# Patient Record
Sex: MALE | URBAN - METROPOLITAN AREA
[De-identification: names, ages, dates, MRNs, and addresses within clinical notes are randomized per-mention and may not be internally consistent; named-entity substitution may affect disease eponyms.]

---

## 2021-04-06 ENCOUNTER — TELEPHONE ENCOUNTER (OUTPATIENT)
Dept: URBAN - METROPOLITAN AREA CLINIC 35 | Facility: CLINIC | Age: 51
End: 2021-04-06

## 2021-04-06 RX ORDER — ALBENDAZOLE 200 MG/1
2 TABLETS TABLET, FILM COATED ORAL ONCE A DAY
Qty: 10 TABLET | Refills: 0 | OUTPATIENT
Start: 2021-04-06

## 2021-07-12 ENCOUNTER — OFFICE VISIT (OUTPATIENT)
Dept: URBAN - METROPOLITAN AREA CLINIC 35 | Facility: CLINIC | Age: 51
End: 2021-07-12

## 2021-07-12 ENCOUNTER — TELEPHONE ENCOUNTER (OUTPATIENT)
Dept: URBAN - METROPOLITAN AREA CLINIC 35 | Facility: CLINIC | Age: 51
End: 2021-07-12

## 2021-07-12 VITALS
HEIGHT: 73 IN | HEART RATE: 94 BPM | SYSTOLIC BLOOD PRESSURE: 135 MMHG | WEIGHT: 180 LBS | BODY MASS INDEX: 23.86 KG/M2 | OXYGEN SATURATION: 97 % | DIASTOLIC BLOOD PRESSURE: 70 MMHG

## 2021-07-12 RX ORDER — SEMAGLUTIDE 1.34 MG/ML
AS DIRECTED INJECTION, SOLUTION SUBCUTANEOUS
Status: ACTIVE | COMMUNITY

## 2021-07-12 RX ORDER — ACETAMINOPHEN,PHENIRAMINE MALEATE, PHENYLEPHRINE HYDROCHLORIDE 650; 20; 10 MG/15000MG; MG/15000MG; MG/15000MG
AS DIRECTED POWDER, FOR SUSPENSION ORAL
OUTPATIENT
Start: 2021-07-12

## 2021-07-12 RX ORDER — EMPAGLIFLOZIN 25 MG/1
1 TABLET TABLET, FILM COATED ORAL ONCE A DAY
Qty: 30 | Status: ACTIVE | COMMUNITY

## 2021-07-12 RX ORDER — METFORMIN HYDROCHLORIDE 1000 MG/1
1 TABLET WITH A MEAL TABLET, FILM COATED ORAL ONCE A DAY
Qty: 30 | Status: ACTIVE | COMMUNITY

## 2021-07-12 RX ORDER — KRILL/OM-3/DHA/EPA/PHOSPHO/AST 1000-230MG
1 TABLET CAPSULE ORAL ONCE A DAY
Qty: 30 | Status: ON HOLD | COMMUNITY

## 2021-07-12 RX ORDER — ASPIRIN 81 MG/1
1 TABLET TABLET, COATED ORAL ONCE A DAY
Qty: 30 | Status: ACTIVE | COMMUNITY

## 2021-07-12 RX ORDER — SODIUM SULFATE, POTASSIUM SULFATE, MAGNESIUM SULFATE 17.5; 3.13; 1.6 G/ML; G/ML; G/ML
AS DIRECTED SOLUTION, CONCENTRATE ORAL
Qty: 1 | Refills: 0 | OUTPATIENT
Start: 2021-07-12

## 2021-07-12 RX ORDER — ATORVASTATIN CALCIUM 80 MG/1
1 TABLET TABLET, FILM COATED ORAL ONCE A DAY
Qty: 30 | Status: ACTIVE | COMMUNITY

## 2021-07-12 RX ORDER — ALBENDAZOLE 200 MG/1
2 TABLETS TABLET, FILM COATED ORAL ONCE A DAY
Qty: 10 TABLET | Refills: 0 | Status: ON HOLD | COMMUNITY
Start: 2021-04-06

## 2021-07-12 RX ORDER — PIOGLITAZONE 15 MG/1
1 TABLET TABLET ORAL ONCE A DAY
Qty: 30 | Status: ACTIVE | COMMUNITY

## 2021-07-12 RX ORDER — LOSARTAN POTASSIUM 50 MG/1
1 TABLET TABLET ORAL ONCE A DAY
Qty: 30 | Status: ACTIVE | COMMUNITY

## 2021-07-12 NOTE — HPI-MIGRATED HPI
;     Change in Bowel habits : Patient admits longstanding history of constipation.  Onset was a couple of years now.  Patient currently admits 1 bowel movement every 2-3 days.  Stools are marbles.  Patient denies blood, mucus, or melena in stools.  Patient states that his previous bowel habits were 1 movement per a day, with normal and formed stools.  Patient notes 5-6 hour flights/ certain foods/ dehydration aggravating factors and alleviating factors and has tried fiberous foods with no relief of symptoms.    He will have a couple weeks with daily BMs and then be constipated for a week then go daily again, but it seems to be a cycle. He is unsure if dehydration is a part of it.  Patient denies associated abdominal pains, bloating, and gas.  When a patient has a bowel movement he does not feel like he is completely emptying his bowels.  Patient denies fecal incontinence.  Patient denies recently drinking lake or well water. Admits to taking antibiotics in the last 6 months called Albendazole 200 mg. Patient states that his and his family was traveling and his son and wife tested positive for a stomach virus. Patient denies having a colonoscopy/EGD.     ;

## 2021-07-16 ENCOUNTER — TELEPHONE ENCOUNTER (OUTPATIENT)
Dept: URBAN - METROPOLITAN AREA CLINIC 35 | Facility: CLINIC | Age: 51
End: 2021-07-16

## 2021-07-19 ENCOUNTER — TELEPHONE ENCOUNTER (OUTPATIENT)
Dept: URBAN - METROPOLITAN AREA CLINIC 35 | Facility: CLINIC | Age: 51
End: 2021-07-19

## 2021-07-23 ENCOUNTER — OFFICE VISIT (OUTPATIENT)
Dept: URBAN - METROPOLITAN AREA SURGERY CENTER 8 | Facility: SURGERY CENTER | Age: 51
End: 2021-07-23

## 2021-08-13 ENCOUNTER — OFFICE VISIT (OUTPATIENT)
Dept: URBAN - METROPOLITAN AREA CLINIC 35 | Facility: CLINIC | Age: 51
End: 2021-08-13

## 2021-08-23 ENCOUNTER — TELEPHONE ENCOUNTER (OUTPATIENT)
Dept: URBAN - METROPOLITAN AREA CLINIC 35 | Facility: CLINIC | Age: 51
End: 2021-08-23

## 2021-08-27 ENCOUNTER — OFFICE VISIT (OUTPATIENT)
Dept: URBAN - METROPOLITAN AREA SURGERY CENTER 8 | Facility: SURGERY CENTER | Age: 51
End: 2021-08-27

## 2021-08-27 ENCOUNTER — TELEPHONE ENCOUNTER (OUTPATIENT)
Dept: URBAN - METROPOLITAN AREA CLINIC 35 | Facility: CLINIC | Age: 51
End: 2021-08-27

## 2021-08-27 RX ORDER — SODIUM PICOSULFATE, MAGNESIUM OXIDE, AND ANHYDROUS CITRIC ACID 10; 3.5; 12 MG/160ML; G/160ML; G/160ML
AS DIRECTED LIQUID ORAL
Qty: 1 KIT | OUTPATIENT
Start: 2021-08-27

## 2021-09-10 ENCOUNTER — TELEPHONE ENCOUNTER (OUTPATIENT)
Dept: URBAN - METROPOLITAN AREA CLINIC 35 | Facility: CLINIC | Age: 51
End: 2021-09-10

## 2021-09-10 ENCOUNTER — OFFICE VISIT (OUTPATIENT)
Dept: URBAN - METROPOLITAN AREA CLINIC 35 | Facility: CLINIC | Age: 51
End: 2021-09-10

## 2021-09-10 ENCOUNTER — OFFICE VISIT (OUTPATIENT)
Dept: URBAN - METROPOLITAN AREA SURGERY CENTER 8 | Facility: SURGERY CENTER | Age: 51
End: 2021-09-10

## 2021-09-24 ENCOUNTER — OFFICE VISIT (OUTPATIENT)
Dept: URBAN - METROPOLITAN AREA CLINIC 35 | Facility: CLINIC | Age: 51
End: 2021-09-24

## 2021-09-27 PROBLEM — 14760008 CONSTIPATION: Status: ACTIVE | Noted: 2021-07-12

## 2021-09-28 ENCOUNTER — DASHBOARD ENCOUNTERS (OUTPATIENT)
Age: 51
End: 2021-09-28

## 2021-09-28 ENCOUNTER — OFFICE VISIT (OUTPATIENT)
Dept: URBAN - METROPOLITAN AREA CLINIC 31 | Facility: CLINIC | Age: 51
End: 2021-09-28

## 2021-09-28 ENCOUNTER — TELEPHONE ENCOUNTER (OUTPATIENT)
Dept: URBAN - METROPOLITAN AREA CLINIC 35 | Facility: CLINIC | Age: 51
End: 2021-09-28

## 2021-09-28 VITALS — HEART RATE: 88 BPM | BODY MASS INDEX: 24.65 KG/M2 | WEIGHT: 186 LBS | OXYGEN SATURATION: 96 % | HEIGHT: 73 IN

## 2021-09-28 RX ORDER — ASPIRIN 81 MG/1
1 TABLET TABLET, COATED ORAL ONCE A DAY
Qty: 30 | Status: ACTIVE | COMMUNITY

## 2021-09-28 RX ORDER — LOSARTAN POTASSIUM 50 MG/1
1 TABLET TABLET ORAL ONCE A DAY
Qty: 30 | Status: ACTIVE | COMMUNITY

## 2021-09-28 RX ORDER — SODIUM PICOSULFATE, MAGNESIUM OXIDE, AND ANHYDROUS CITRIC ACID 10; 3.5; 12 MG/160ML; G/160ML; G/160ML
AS DIRECTED LIQUID ORAL
Qty: 1 KIT | Status: DISCONTINUED | COMMUNITY
Start: 2021-08-27

## 2021-09-28 RX ORDER — ALBENDAZOLE 200 MG/1
2 TABLETS TABLET, FILM COATED ORAL ONCE A DAY
Qty: 10 TABLET | Refills: 0 | Status: ON HOLD | COMMUNITY
Start: 2021-04-06

## 2021-09-28 RX ORDER — KRILL/OM-3/DHA/EPA/PHOSPHO/AST 1000-230MG
1 TABLET CAPSULE ORAL ONCE A DAY
Qty: 30 | Status: ON HOLD | COMMUNITY

## 2021-09-28 RX ORDER — PIOGLITAZONE 15 MG/1
1 TABLET TABLET ORAL ONCE A DAY
Qty: 30 | Status: ACTIVE | COMMUNITY

## 2021-09-28 RX ORDER — METFORMIN HYDROCHLORIDE 1000 MG/1
1 TABLET WITH A MEAL TABLET, FILM COATED ORAL ONCE A DAY
Qty: 30 | Status: ACTIVE | COMMUNITY

## 2021-09-28 RX ORDER — ACETAMINOPHEN,PHENIRAMINE MALEATE, PHENYLEPHRINE HYDROCHLORIDE 650; 20; 10 MG/15000MG; MG/15000MG; MG/15000MG
AS DIRECTED POWDER, FOR SUSPENSION ORAL
Status: DISCONTINUED | COMMUNITY
Start: 2021-07-12

## 2021-09-28 RX ORDER — DOCUSATE SODIUM 100 MG/1
1 CAPSULE AS NEEDED CAPSULE ORAL ONCE A DAY
Qty: 30 | OUTPATIENT
Start: 2021-09-28

## 2021-09-28 RX ORDER — SEMAGLUTIDE 1.34 MG/ML
AS DIRECTED INJECTION, SOLUTION SUBCUTANEOUS
Status: ACTIVE | COMMUNITY

## 2021-09-28 RX ORDER — EMPAGLIFLOZIN 25 MG/1
1 TABLET TABLET, FILM COATED ORAL ONCE A DAY
Qty: 30 | Status: ACTIVE | COMMUNITY

## 2021-09-28 RX ORDER — SODIUM SULFATE, POTASSIUM SULFATE, MAGNESIUM SULFATE 17.5; 3.13; 1.6 G/ML; G/ML; G/ML
AS DIRECTED SOLUTION, CONCENTRATE ORAL
Qty: 1 | Refills: 0 | Status: DISCONTINUED | COMMUNITY
Start: 2021-07-12

## 2021-09-28 RX ORDER — ATORVASTATIN CALCIUM 80 MG/1
1 TABLET TABLET, FILM COATED ORAL ONCE A DAY
Qty: 30 | Status: ACTIVE | COMMUNITY

## 2021-09-28 NOTE — HPI-MIGRATED HPI
;     Constipation : Patient presents today for a follow up of constipation and to review his colonoscopy results. He denies any complications after his procedure.   Currently reports 2 bowel movements every 3 days with strain. His stools are sausage-like.  Denies melena, blood or mucus in stools, rectal pain/bleeding or pruritus ani.   He has not taken Miralax since his colonoscopy.  He report constipation began after he started Diabetes treatment with [facility] 3-4 years ago.  Of note, patient did not start the Colace as recommended, but plans to.    Last visit (7/12/2021)  Patient presents today to review colonoscopy that was completed on 9.10.2021. He denies any complications following the procedure.  He was previously seen in clinic by LUPE Pulido. Whom recommended patient to start Miralax daily, titrate to effect. ;